# Patient Record
Sex: MALE | Race: BLACK OR AFRICAN AMERICAN | NOT HISPANIC OR LATINO | Employment: FULL TIME | URBAN - METROPOLITAN AREA
[De-identification: names, ages, dates, MRNs, and addresses within clinical notes are randomized per-mention and may not be internally consistent; named-entity substitution may affect disease eponyms.]

---

## 2024-03-22 ENCOUNTER — OFFICE VISIT (OUTPATIENT)
Dept: URGENT CARE | Facility: URGENT CARE | Age: 44
End: 2024-03-22
Payer: COMMERCIAL

## 2024-03-22 ENCOUNTER — TELEPHONE (OUTPATIENT)
Dept: URGENT CARE | Facility: URGENT CARE | Age: 44
End: 2024-03-22

## 2024-03-22 VITALS
TEMPERATURE: 98.5 F | OXYGEN SATURATION: 99 % | RESPIRATION RATE: 16 BRPM | SYSTOLIC BLOOD PRESSURE: 159 MMHG | WEIGHT: 175.8 LBS | HEART RATE: 73 BPM | DIASTOLIC BLOOD PRESSURE: 92 MMHG

## 2024-03-22 DIAGNOSIS — R52 GENERALIZED BODY ACHES: Primary | ICD-10-CM

## 2024-03-22 LAB
DEPRECATED S PYO AG THROAT QL EIA: NEGATIVE
FLUAV AG SPEC QL IA: NEGATIVE
FLUBV AG SPEC QL IA: NEGATIVE
GROUP A STREP BY PCR: NOT DETECTED
SARS-COV-2 RNA RESP QL NAA+PROBE: NEGATIVE

## 2024-03-22 PROCEDURE — 87651 STREP A DNA AMP PROBE: CPT | Performed by: PHYSICIAN ASSISTANT

## 2024-03-22 PROCEDURE — 99203 OFFICE O/P NEW LOW 30 MIN: CPT | Performed by: PHYSICIAN ASSISTANT

## 2024-03-22 PROCEDURE — 87804 INFLUENZA ASSAY W/OPTIC: CPT | Performed by: PHYSICIAN ASSISTANT

## 2024-03-22 PROCEDURE — 87635 SARS-COV-2 COVID-19 AMP PRB: CPT | Performed by: PHYSICIAN ASSISTANT

## 2024-03-22 RX ORDER — IBUPROFEN 800 MG/1
800 TABLET, FILM COATED ORAL EVERY 8 HOURS PRN
Qty: 30 TABLET | Refills: 0 | Status: SHIPPED | OUTPATIENT
Start: 2024-03-22 | End: 2024-04-01

## 2024-03-22 NOTE — LETTER
March 22, 2024      Jonathan Perkins  [address]   [facility] [place] [address]        To Whom It May Concern:    Jonathan Perkins  was seen on 3/22/2024. Please excuse him from work.        Sincerely,        Viviane Peguero PA-C

## 2024-03-22 NOTE — TELEPHONE ENCOUNTER
Tried calling, no answer, didn't leave message.    Please relay provider's message below to pt if pt return call to clinic.    BREE Isaac PA-C  3/22/2024 11:51 AM CDT       Please call to inform that strep and influenza were negative. [phone]

## 2024-03-22 NOTE — PROGRESS NOTES
Chief Complaint   Patient presents with    Urgent Care    Pain     Pain all over body and joints, today                ASSESSMENT:     ICD-10-CM    1. Generalized body aches  R52             PLAN: Acute onset of generalized bodyaches.  COVID, influenza, strep test pending.  Prescription ibuprofen 800 mg by mouth 3 times daily as needed  I have discussed clinical findings with patient.  Side effects of medications discussed.  Symptomatic care is discussed.  I have discussed the possibility of  worsening symptoms and indication to RTC or go to the ER if they occur.  All questions are answered, patient indicates understanding of these issues and is in agreement with plan.   Patient care instructions are discussed/given at the end of visit.   Lots of rest and fluids.      Viviane Peguero PA-C      SUBJECTIVE:  43-year-old male presents for evaluation of allover muscle and joint pains that started today.  Mainly in the neck, arms, legs.  Denies cough, sore throat, fever.  Does feel tired.      No Known Allergies    No past medical history on file.    No current outpatient medications on file prior to visit.  No current facility-administered medications on file prior to visit.      Social History     Tobacco Use    Smoking status: Never    Smokeless tobacco: Never   Substance Use Topics    Alcohol use: Not Currently       ROS:  CONSTITUTIONAL: Negative for fatigue or fever.  EYES: Negative for eye problems.  ENT: As above.  RESP: As above.  CV: Negative for chest pains.  GI: Negative for vomiting.  MUSCULOSKELETAL:  Negative for significant muscle or joint pains.  NEUROLOGIC: Negative for headaches.  SKIN: Negative for rash.  PSYCH: Normal mentation for age.    OBJECTIVE:  BP (!) 159/92 (BP Location: Left arm, Patient Position: Sitting, Cuff Size: Adult Regular)   Pulse 73   Temp 98.5  F (36.9  C) (Tympanic)   Resp 16   Wt 79.7 kg (175 lb 12.8 oz)   SpO2 99%   GENERAL APPEARANCE: Healthy, alert and no  distress.  EYES:Conjunctiva/sclera clear.  EARS: No cerumen.   Ear canals w/o erythema.  TM's intact w/o erythema.    THROAT: No erythema w/o tonsillar enlargement . No exudates.  NECK: Supple, nontender, no lymphadenopathy.  Full range of motion with some mild stiffness.  RESP: Lungs clear to auscultation - no rales, rhonchi or wheezes  CV: Regular rate and rhythm, normal S1 S2, no murmur noted.  NEURO: Awake, alert    SKIN: No rashes      Viviane Peguero PA-C

## 2024-03-22 NOTE — TELEPHONE ENCOUNTER
Patient returning call and FNA relayed message from Viviane Peguero PA-C and patient understands.       Alcira Lopez RN/FNA

## 2024-03-27 ENCOUNTER — OFFICE VISIT (OUTPATIENT)
Dept: FAMILY MEDICINE | Facility: CLINIC | Age: 44
End: 2024-03-27
Payer: COMMERCIAL

## 2024-03-27 VITALS
RESPIRATION RATE: 16 BRPM | TEMPERATURE: 100.2 F | DIASTOLIC BLOOD PRESSURE: 87 MMHG | OXYGEN SATURATION: 98 % | HEART RATE: 100 BPM | SYSTOLIC BLOOD PRESSURE: 135 MMHG | HEIGHT: 66 IN | BODY MASS INDEX: 28.94 KG/M2 | WEIGHT: 180.1 LBS

## 2024-03-27 DIAGNOSIS — R50.9 FEVER, UNSPECIFIED FEVER CAUSE: Primary | ICD-10-CM

## 2024-03-27 DIAGNOSIS — J10.1 INFLUENZA B: ICD-10-CM

## 2024-03-27 DIAGNOSIS — M79.10 MYALGIA: ICD-10-CM

## 2024-03-27 LAB
FLUAV AG SPEC QL IA: NEGATIVE
FLUBV AG SPEC QL IA: POSITIVE

## 2024-03-27 PROCEDURE — 99214 OFFICE O/P EST MOD 30 MIN: CPT | Performed by: INTERNAL MEDICINE

## 2024-03-27 PROCEDURE — 87804 INFLUENZA ASSAY W/OPTIC: CPT | Performed by: INTERNAL MEDICINE

## 2024-03-27 ASSESSMENT — PAIN SCALES - GENERAL: PAINLEVEL: WORST PAIN (10)

## 2024-03-27 NOTE — LETTER
March 27, 2024      Jonathan Perkins  [address]   [facility] [address]        To Whom It May Concern:    Jonathan Perkins was seen in our clinic.   He tested positive Influenza B  He may return to work without restrictions on 04/01/2024      Sincerely,        Rigoberto Granda MD

## 2024-03-27 NOTE — PROGRESS NOTES
"  Assessment & Plan     Fever, unspecified fever cause  Started feeling cold today  Was having shivering when he came out of work  Complaining of bodyaches  Fever was 100.2 here  Denies any fever at home  - Influenza A & B Antigen    Myalgia  He went to the [facility] [place] urgent care on Friday and had his swabs done for influenza/COVID/strep  and all of them were negative  - Influenza A & B Antigen    Influenza B  Swab came as positive for influenza B  He denies any upper respiratory symptoms  No cough  Just has bodyaches  Discussed about Tamiflu  He is not keen to take that because of the side effects  Discussed about taking Tylenol and ibuprofen as needed  Work note given      30 minutes spent by me on the date of the encounter doing chart review, history and exam, documentation and further activities per the note    MED REC REQUIRED  Post Medication Reconciliation Status: discharge medications reconciled and changed, per note/orders  BMI  Estimated body mass index is 29.07 kg/m  as calculated from the following:    Height as of this encounter: 1.676 m (5' 6\").    Weight as of this encounter: 81.7 kg (180 lb 1.6 oz).             Radha Castillo is a 43 year old, presenting for the following health issues:  Hospital F/U        3/27/2024     3:10 PM   Additional Questions   Roomed by Jefferson   Accompanied by Self         3/27/2024     3:10 PM   Patient Reported Additional Medications   Patient reports taking the following new medications None     HPI         Urgent Care Follow-up Visit:    Hospital/Nursing Home/IP Rehab Facility:  [facility]  Date of Admission: 3/22/24  Date of Discharge: 3/22/24  Reason(s) for Admission: Full body pain    Was your hospitalization related to COVID-19? No    Problems taking medications regularly:  None  Medication changes since discharge: None  Problems adhering to non-medication therapy:  None    Summary of hospitalization:  [facility] hospital discharge " "summary reviewed  Diagnostic Tests/Treatments reviewed.  Follow up needed: none  Other Healthcare Providers Involved in Patient s Care:         None  Update since discharge: fluctuating course.         Plan of care communicated with patient                   Review of Systems  Constitutional, HEENT, cardiovascular, pulmonary, gi and gu systems are negative, except as otherwise noted.      Objective    /87 (BP Location: Right arm, Patient Position: Sitting, Cuff Size: Adult Regular)   Pulse 100   Temp 100.2  F (37.9  C) (Tympanic)   Resp 16   Ht 1.676 m (5' 6\")   Wt 81.7 kg (180 lb 1.6 oz)   SpO2 98%   BMI 29.07 kg/m    Body mass index is 29.07 kg/m .  Physical Exam   GENERAL: alert and no distress  NECK: no adenopathy, no asymmetry, masses, or scars  RESP: lungs clear to auscultation - no rales, rhonchi or wheezes  CV: regular rate and rhythm, normal S1 S2, no S3 or S4, no murmur, click or rub, no peripheral edema  ABDOMEN: soft, nontender, no hepatosplenomegaly, no masses and bowel sounds normal  MS: no gross musculoskeletal defects noted, no edema            Signed Electronically by: Rigoberto Granda MD    "

## 2024-05-19 ENCOUNTER — HEALTH MAINTENANCE LETTER (OUTPATIENT)
Age: 44
End: 2024-05-19

## 2025-02-11 ENCOUNTER — OFFICE VISIT (OUTPATIENT)
Dept: URGENT CARE | Facility: URGENT CARE | Age: 45
End: 2025-02-11
Payer: COMMERCIAL

## 2025-02-11 VITALS
OXYGEN SATURATION: 99 % | BODY MASS INDEX: 29.34 KG/M2 | TEMPERATURE: 98.2 F | DIASTOLIC BLOOD PRESSURE: 82 MMHG | SYSTOLIC BLOOD PRESSURE: 146 MMHG | HEART RATE: 70 BPM | RESPIRATION RATE: 16 BRPM | WEIGHT: 181.8 LBS

## 2025-02-11 DIAGNOSIS — M79.651 PAIN OF RIGHT THIGH: Primary | ICD-10-CM

## 2025-02-11 PROCEDURE — 99213 OFFICE O/P EST LOW 20 MIN: CPT | Performed by: PHYSICIAN ASSISTANT

## 2025-02-11 RX ORDER — METHOCARBAMOL 500 MG/1
500 TABLET, FILM COATED ORAL 4 TIMES DAILY PRN
Qty: 30 TABLET | Refills: 0 | Status: SHIPPED | OUTPATIENT
Start: 2025-02-11

## 2025-02-11 RX ORDER — PREDNISONE 20 MG/1
40 TABLET ORAL DAILY
Qty: 10 TABLET | Refills: 0 | Status: SHIPPED | OUTPATIENT
Start: 2025-02-11 | End: 2025-02-16

## 2025-02-11 ASSESSMENT — ENCOUNTER SYMPTOMS
BACK PAIN: 0
NUMBNESS: 0
FATIGUE: 0
CHILLS: 0
COLOR CHANGE: 0
WOUND: 0
NECK STIFFNESS: 0
PALPITATIONS: 0
MYALGIAS: 1
NECK PAIN: 0
JOINT SWELLING: 0
CARDIOVASCULAR NEGATIVE: 1
ARTHRALGIAS: 0
FEVER: 0

## 2025-02-11 NOTE — PROGRESS NOTES
Radha Castillo is a 44 year old, presenting for the following health issues:  thigh pain (C/o sharp pain in right thigh, pt has been rubbing ointment on area, going on 4 days ago ) and side pain (Pt having intermittent sharp pain on right side, sharp pain when sneezing. Pain in neck. )    HPI   Musculoskeletal problem/pain  Onset/Duration: 2days  Description  Location: R upper leg  Joint Swelling: no  Redness: no  Pain: YES  Warmth: no  Intensity:  moderate  Progression of Symptoms:  same  Accompanying signs and symptoms:   Fevers: no  Numbness/tingling/weakness: no but describes a needle poking sensation localized to the lateral aspect.  No back pain. No bladder or bowel dysfunction.  No swelling, redness, drainage or fevers.    History  Trauma to the area: no but he does work out a lot at the gym  Recent illness:  no  Previous similar problem: no  Previous evaluation:  no  Precipitating or alleviating factors:  Aggravating factors include: pressure, palpation, overuse  Therapies tried and outcome: rest/inactivity, topical ointment, with minimal relief    There is no problem list on file for this patient.    No current outpatient medications on file.     No current facility-administered medications for this visit.      No Known Allergies    Review of Systems   Constitutional:  Negative for chills, fatigue and fever.   Cardiovascular: Negative.  Negative for chest pain, palpitations and leg swelling.   Musculoskeletal:  Positive for myalgias. Negative for arthralgias, back pain, gait problem, joint swelling, neck pain and neck stiffness.   Skin:  Negative for color change, pallor, rash and wound.   Neurological:  Negative for numbness.   All other systems reviewed and are negative.          Objective    BP (!) 146/82 (BP Location: Left arm, Patient Position: Sitting, Cuff Size: Adult Large)   Pulse 70   Temp 98.2  F (36.8  C) (Oral)   Resp 16   Wt 82.5 kg (181 lb 12.8 oz)   SpO2 99%   BMI 29.34 kg/m     Body mass index is 29.34 kg/m .  Physical Exam  Vitals and nursing note reviewed.   Constitutional:       General: He is not in acute distress.     Appearance: Normal appearance. He is normal weight. He is not ill-appearing.   Musculoskeletal:      Lumbar back: Normal. No swelling, signs of trauma, lacerations, spasms, tenderness or bony tenderness. Normal range of motion. Negative right straight leg raise test and negative left straight leg raise test.      Right upper leg: Tenderness (lateral aspect.  no masses or lesions) present. No swelling, deformity, lacerations or bony tenderness.      Left upper leg: Normal. No tenderness or bony tenderness.      Right knee: Normal.      Left knee: Normal.   Skin:     General: Skin is warm.      Capillary Refill: Capillary refill takes less than 2 seconds.   Neurological:      Mental Status: He is alert and oriented to person, place, and time.      Sensory: Sensation is intact.      Motor: Motor function is intact.      Gait: Gait is intact.      Deep Tendon Reflexes: Reflexes are normal and symmetric.   Psychiatric:         Mood and Affect: Mood normal.         Behavior: Behavior normal.         Thought Content: Thought content normal.         Judgment: Judgment normal.              Assessment/Plan:  Pain of right thigh: Most likely strain/sprain/spasm vs pinched nerve.  Recommend RICE and will give [id number] and methocarbamol prn pain.  Discussed risks and benefits of medication along with side effects, direction for use.  No driving or operating machinery due to sedation.  Recheck in clinic if symptoms worsen or if symptoms do not improve.   -     methocarbamol (ROBAXIN) 500 MG tablet; Take 1 tablet (500 mg) by mouth 4 times daily as needed for muscle spasms.  -     predniSONE (DELTASONE) 20 MG tablet; Take 2 tablets (40 mg) by mouth daily for 5 days.        Liyah Murray PA-C

## 2025-06-08 ENCOUNTER — HEALTH MAINTENANCE LETTER (OUTPATIENT)
Age: 45
End: 2025-06-08